# Patient Record
(demographics unavailable — no encounter records)

---

## 2024-11-02 NOTE — HISTORY OF PRESENT ILLNESS
[Father] : father [Normal] : Normal [Brushing teeth twice/d] : brushing teeth twice per day [Yes] : Patient goes to dentist yearly [Tap water] : Primary Fluoride Source: Tap water [Grade ___] : Grade [unfilled] [Special Education] : special education  [No] : No cigarette smoke exposure [Appropriately restrained in motor vehicle] : appropriately restrained in motor vehicle [Supervised outdoor play] : supervised outdoor play [Up to date] : Up to date [NO] : No [FreeTextEntry7] : on ripserdal .25 mg, has eczema [de-identified] : eating better [FreeTextEntry8] : still constipated [FreeTextEntry9] : EDWARD at home [de-identified] : much better in school -  [FreeTextEntry1] : much better at home and school with risperdal

## 2024-11-02 NOTE — PHYSICAL EXAM
[Alert] : alert [No Acute Distress] : no acute distress [Normocephalic] : normocephalic [Conjunctivae with no discharge] : conjunctivae with no discharge [PERRL] : PERRL [EOMI Bilateral] : EOMI bilateral [Auricles Well Formed] : auricles well formed [Clear Tympanic membranes with present light reflex and bony landmarks] : clear tympanic membranes with present light reflex and bony landmarks [No Discharge] : no discharge [Nares Patent] : nares patent [Pink Nasal Mucosa] : pink nasal mucosa [Palate Intact] : palate intact [Nonerythematous Oropharynx] : nonerythematous oropharynx [Supple, full passive range of motion] : supple, full passive range of motion [No Palpable Masses] : no palpable masses [Symmetric Chest Rise] : symmetric chest rise [Clear to Auscultation Bilaterally] : clear to auscultation bilaterally [Regular Rate and Rhythm] : regular rate and rhythm [Normal S1, S2 present] : normal S1, S2 present [No Murmurs] : no murmurs [Soft] : soft [NonTender] : non tender [Non Distended] : non distended [Normoactive Bowel Sounds] : normoactive bowel sounds [No Hepatomegaly] : no hepatomegaly [No Splenomegaly] : no splenomegaly [Joaquin: _____] : Joaquin [unfilled] [Testicles Descended Bilaterally] : testicles descended bilaterally [Patent] : patent [No fissures] : no fissures [No Abnormal Lymph Nodes Palpated] : no abnormal lymph nodes palpated [No Gait Asymmetry] : no gait asymmetry [No pain or deformities with palpation of bone, muscles, joints] : no pain or deformities with palpation of bone, muscles, joints [Normal Muscle Tone] : normal muscle tone [Straight] : straight [+2 Patella DTR] : +2 patella DTR [Cranial Nerves Grossly Intact] : cranial nerves grossly intact [No Rash or Lesions] : no rash or lesions [de-identified] : poor eye contact, autism [de-identified] : mild eczema behind the knees

## 2024-11-02 NOTE — PHYSICAL EXAM
[Alert] : alert [No Acute Distress] : no acute distress [Normocephalic] : normocephalic [Conjunctivae with no discharge] : conjunctivae with no discharge [PERRL] : PERRL [EOMI Bilateral] : EOMI bilateral [Auricles Well Formed] : auricles well formed [Clear Tympanic membranes with present light reflex and bony landmarks] : clear tympanic membranes with present light reflex and bony landmarks [No Discharge] : no discharge [Nares Patent] : nares patent [Pink Nasal Mucosa] : pink nasal mucosa [Palate Intact] : palate intact [Nonerythematous Oropharynx] : nonerythematous oropharynx [Supple, full passive range of motion] : supple, full passive range of motion [No Palpable Masses] : no palpable masses [Symmetric Chest Rise] : symmetric chest rise [Clear to Auscultation Bilaterally] : clear to auscultation bilaterally [Regular Rate and Rhythm] : regular rate and rhythm [Normal S1, S2 present] : normal S1, S2 present [No Murmurs] : no murmurs [Soft] : soft [NonTender] : non tender [Non Distended] : non distended [Normoactive Bowel Sounds] : normoactive bowel sounds [No Hepatomegaly] : no hepatomegaly [No Splenomegaly] : no splenomegaly [Joaquin: _____] : Joaquin [unfilled] [Testicles Descended Bilaterally] : testicles descended bilaterally [Patent] : patent [No fissures] : no fissures [No Abnormal Lymph Nodes Palpated] : no abnormal lymph nodes palpated [No Gait Asymmetry] : no gait asymmetry [No pain or deformities with palpation of bone, muscles, joints] : no pain or deformities with palpation of bone, muscles, joints [Normal Muscle Tone] : normal muscle tone [Straight] : straight [+2 Patella DTR] : +2 patella DTR [Cranial Nerves Grossly Intact] : cranial nerves grossly intact [No Rash or Lesions] : no rash or lesions [de-identified] : poor eye contact, autism [de-identified] : mild eczema behind the knees

## 2024-11-02 NOTE — DISCUSSION/SUMMARY
[FreeTextEntry1] : Continue balanced diet with all food groups. Brush teeth twice a day with toothbrush. Recommend visit to dentist. Help child to maintain consistent daily routines and sleep schedule. School discussed. Ensure home is safe. Teach child about personal safety. Use consistent, positive discipline. Limit screen time to no more than 2 hours per day. Encourage physical activity. Child needs to ride in a belt-positioning booster seat until  4 feet 9 inches has been reached and are between 8 and 12 years of age. Counseling for nutrition and physical activity. Child is diagnosed with autism.  Must have special education setting which specializes in educating children with autism.  Will require intensive behavioral services, parent training, OT, ST, social skills  Continue riperdal as per child psych  20 minutes in discussion and management of problem and excluding any time spent in separate reportable services and/or teaching.

## 2024-11-02 NOTE — HISTORY OF PRESENT ILLNESS
[Father] : father [Normal] : Normal [Brushing teeth twice/d] : brushing teeth twice per day [Yes] : Patient goes to dentist yearly [Tap water] : Primary Fluoride Source: Tap water [Grade ___] : Grade [unfilled] [Special Education] : special education  [No] : No cigarette smoke exposure [Appropriately restrained in motor vehicle] : appropriately restrained in motor vehicle [Supervised outdoor play] : supervised outdoor play [Up to date] : Up to date [NO] : No [FreeTextEntry7] : on ripserdal .25 mg, has eczema [de-identified] : eating better [FreeTextEntry8] : still constipated [FreeTextEntry9] : EDWARD at home [de-identified] : much better in school -  [FreeTextEntry1] : much better at home and school with risperdal

## 2024-11-04 NOTE — HISTORY OF PRESENT ILLNESS
[Fever] : FEVER [___ Day(s)] : [unfilled] day(s) [Known Exposure to COVID-19] : no known exposure to COVID-19 [Sick Contacts: ___] : no sick contacts [Nasal Congestion] : nasal congestion [Cough] : cough [Max Temp: ____] : Max temperature: [unfilled] [FreeTextEntry1] : T102

## 2024-11-04 NOTE — PHYSICAL EXAM
[Mucoid Discharge] : mucoid discharge [Erythematous Oropharynx] : erythematous oropharynx [NL] : regular rate and rhythm, normal S1, S2 audible, no murmurs

## 2024-11-27 NOTE — ASSESSMENT
[FreeTextEntry1] : 8year-old male with a history of autism here for follow-up of constipation.  He has been on senna and Ex-Lax for most of his life and he is currently on 1 Ex-Lax daily and stooling most days.  Stools are generally soft however he does report straining and some pain with stooling.  His diet is very limited and he consumes very little fiber which is likely the culprit.  He denies issues swallowing.  Recommend: -Add 2 to 3 teaspoons Benefiber daily and monitor stools.  If doing well can try decreasing the Ex-Lax to three-quarter square daily.  Goal of soft daily stools - increase fiber and fluids in diet if possible, discussed trying to do smoothies as he does not take much fruit or vegetables but dad thinks he would like smoothies - Family instructed to call if any questions or concerns. Family was asked to make a follow-up visit to be seen in 4-5 months.

## 2024-11-27 NOTE — PHYSICAL EXAM
[Well Developed] : well developed [Well Nourished] : well nourished [NAD] : in no acute distress [Alert and Active] : alert and active [PERRL] : pupils were equal, round, reactive to light  [Moist & Pink Mucous Membranes] : moist and pink mucous membranes [Normal Oropharynx] : the oropharynx was normal [CTAB] : lungs clear to auscultation bilaterally [Regular Rate and Rhythm] : regular rate and rhythm [Normal S1, S2] : normal S1 and S2 [Soft] : soft  [Normal Bowel Sounds] : normal bowel sounds [No HSM] : no hepatosplenomegaly appreciated [Rectal Exam Deferred] : rectal exam was deferred [Normal Tone] : normal tone [Well-Perfused] : well-perfused [Interactive] : interactive [Appropriate Affect] : appropriate affect [Appropriate Behavior] : appropriate behavior [icteric] : anicteric [Oral Ulcers] : no oral ulcers [Respiratory Distress] : no respiratory distress  [Wheeze] : no wheezing  [Murmur] : no murmur [Distended] : non distended [Tender] : non tender [Stool Palpable] : no stool palpable [Mass ___ cm] : no masses were palpated [Lymphadenopathy] : no lymphadenopathy  [Edema] : no edema [Cyanosis] : no cyanosis [Rash] : no rash [Jaundice] : no jaundice [de-identified] : Had trouble staying on topic at times

## 2024-11-27 NOTE — PHYSICAL EXAM
[Well Developed] : well developed [Well Nourished] : well nourished [NAD] : in no acute distress [Alert and Active] : alert and active [PERRL] : pupils were equal, round, reactive to light  [Moist & Pink Mucous Membranes] : moist and pink mucous membranes [Normal Oropharynx] : the oropharynx was normal [CTAB] : lungs clear to auscultation bilaterally [Regular Rate and Rhythm] : regular rate and rhythm [Normal S1, S2] : normal S1 and S2 [Soft] : soft  [Normal Bowel Sounds] : normal bowel sounds [No HSM] : no hepatosplenomegaly appreciated [Rectal Exam Deferred] : rectal exam was deferred [Normal Tone] : normal tone [Well-Perfused] : well-perfused [Interactive] : interactive [Appropriate Affect] : appropriate affect [Appropriate Behavior] : appropriate behavior [icteric] : anicteric [Oral Ulcers] : no oral ulcers [Respiratory Distress] : no respiratory distress  [Wheeze] : no wheezing  [Murmur] : no murmur [Distended] : non distended [Tender] : non tender [Stool Palpable] : no stool palpable [Mass ___ cm] : no masses were palpated [Lymphadenopathy] : no lymphadenopathy  [Edema] : no edema [Cyanosis] : no cyanosis [Rash] : no rash [Jaundice] : no jaundice [de-identified] : Had trouble staying on topic at times

## 2024-11-27 NOTE — CONSULT LETTER
[Dear  ___] : Dear  [unfilled], [Courtesy Letter:] : I had the pleasure of seeing your patient, [unfilled], in my office today. [Please see my note below.] : Please see my note below. [Consult Closing:] : Thank you very much for allowing me to participate in the care of this patient.  If you have any questions, please do not hesitate to contact me. [Sincerely,] : Sincerely, [FreeTextEntry3] : Trinidad Lepe MD\par  Attending Physician\par  Pediatric Gastroenterology and Nutrition

## 2024-11-27 NOTE — HISTORY OF PRESENT ILLNESS
[de-identified] : This is a 8  year old patient is on the autism spectrum here for follow-up of constipation.  I reviewed the recent notes by the pediatrician.  He is generally stooling daily, but misses a day about 1x per wk. [place] 4-5. There is no blood or mucous in the stool. Sometimes hard to stool.   He is on 1 ex-lax daily and is not taking MiraLAX anymore.  No stool accidents. No stomach aches.  Eating well, picky,  Recently he has been eating better, started a new medication 0.25 Risperdal,  He is doing better , eating more food and more variety, but not many fruit or veg, he does like smoothies. No trouble swallowing food.  Food is not getting stuck.  He gained weight.

## 2024-11-27 NOTE — HISTORY OF PRESENT ILLNESS
[de-identified] : This is a 8  year old patient is on the autism spectrum here for follow-up of constipation.  I reviewed the recent notes by the pediatrician.  He is generally stooling daily, but misses a day about 1x per wk. [place] 4-5. There is no blood or mucous in the stool. Sometimes hard to stool.   He is on 1 ex-lax daily and is not taking MiraLAX anymore.  No stool accidents. No stomach aches.  Eating well, picky,  Recently he has been eating better, started a new medication 0.25 Risperdal,  He is doing better , eating more food and more variety, but not many fruit or veg, he does like smoothies. No trouble swallowing food.  Food is not getting stuck.  He gained weight.

## 2024-11-27 NOTE — PHYSICAL EXAM
[Wheezing] : wheezing [NL] : soft, nontender, nondistended, normal bowel sounds, no hepatosplenomegaly

## 2024-11-29 NOTE — HISTORY OF PRESENT ILLNESS
[FreeTextEntry6] : mom took o2 sat while sleeping and it was 87 - called 911 and then it was 94 when awake and gave albuterol.ow is fnow is fine discussed use of oximeter coughs non-stop at night

## 2025-01-08 NOTE — HISTORY OF PRESENT ILLNESS
[FreeTextEntry6] : c/o of left frontal temporal headache -  banged head over weekend on table  no vomiting stuffed nose better now

## 2025-01-08 NOTE — PHYSICAL EXAM
[Tenderness] : no tenderness [Ecchymosis] : no ecchymosis [Swelling] : no swelling [EOMI] : grossly EOMI [NL] : soft, nontender, nondistended, normal bowel sounds, no hepatosplenomegaly [FreeTextEntry5] : fundi benign

## 2025-01-10 NOTE — HISTORY OF PRESENT ILLNESS
[FreeTextEntry1] : Eczema [de-identified] : pt with ASD here for  fu:  Atopic dermatitis  Present x years- using hydrocortisone, flaring on face and body, itchy, hives up when he scratches. Itch disrupting school given his ASD   Moisturizes with cerave cream  Mustela for soap

## 2025-01-10 NOTE — ASSESSMENT
[FreeTextEntry1] : 1. Atopic dermatitis 2. Xerosis - FACE: START Hydrocortisone 2.5% BID ointment as needed to rough areas on face, avoid eyes, 2 weeks on / 1 week off, SED.  - BODY: START triamcinolone 0.1% ointment BID x 2-3 weeks PRN flare, take 1 week break before repeated cycles PRN flare. Avoid face, axilla, groin.  - Dry skin care and approved product list reviewed - switch from creams to ointments for winter  - START Allegra 1-2x/daily

## 2025-01-10 NOTE — HISTORY OF PRESENT ILLNESS
[FreeTextEntry1] : Eczema [de-identified] : pt with ASD here for  fu:  Atopic dermatitis  Present x years- using hydrocortisone, flaring on face and body, itchy, hives up when he scratches. Itch disrupting school given his ASD   Moisturizes with cerave cream  Mustela for soap

## 2025-01-10 NOTE — PHYSICAL EXAM
[Alert] : alert [Oriented x 3] : ~L oriented x 3 [FreeTextEntry3] : Focused exam performed. Findings notable for: Thin eczematous patches on abdomen and lower extremities Generalized xerosis hypopigmented patches on b/l cheeks with thin eczematous patches

## 2025-01-27 NOTE — HISTORY OF PRESENT ILLNESS
[EENT/Resp Symptoms] : EENT/RESPIRATORY SYMPTOMS [Runny nose] : runny nose [Cough] : cough [___ Day(s)] : [unfilled] day(s) [Clear rhinorrhea] : clear rhinorrhea [Sick Contacts: ___] : no sick contacts [Fever] : no fever [Vomiting] : no vomiting [Diarrhea] : no diarrhea

## 2025-01-27 NOTE — DISCUSSION/SUMMARY
[FreeTextEntry1] : uri supportive care including nsnd, humidifier and saline nebulization if available azelastine nasal spray zyrtec

## 2025-02-12 NOTE — PHYSICAL EXAM
[Rales] : rales [Rhonchi] : rhonchi [NL] : soft, nontender, nondistended, normal bowel sounds, no hepatosplenomegaly [FreeTextEntry7] : right posterior

## 2025-02-12 NOTE — DISCUSSION/SUMMARY
[FreeTextEntry1] : fever cough bronchitis ventolin 2 puffs now o2 sat improved azithromycin resp panel call if sx worsen    30 minutes in discussion and management of problem and excluding any time spent in separate reportable services and/or teaching.

## 2025-02-12 NOTE — HISTORY OF PRESENT ILLNESS
[Fever] : FEVER [___ Day(s)] : [unfilled] day(s) [Cough] : cough [Vomiting] : vomiting [Max Temp: ____] : Max temperature: [unfilled] [Sick Contacts: ___] : no sick contacts [FreeTextEntry4] : albuterol [FreeTextEntry5] : after coughing [de-identified] : temp 103 last nigh seen at [facility] x2 - neg ss neg flu

## 2025-02-17 NOTE — PHYSICAL EXAM
[NL] : soft, nontender, nondistended, normal bowel sounds, no hepatosplenomegaly [FreeTextEntry4] : congestion

## 2025-02-17 NOTE — DISCUSSION/SUMMARY
[FreeTextEntry1] : marked nasal congestion continue ventolin 2-3x/day azelestine nasal spray saline neb - demostrated in office zyrte  phone f/u on wednesday

## 2025-02-21 NOTE — HISTORY OF PRESENT ILLNESS
[No Personal or Family History of Easy Bruising, Bleeding, or Issues with General Anesthesia] : No Personal or Family History of easy bruising, bleeding, or issues with general anesthesia [de-identified] : 7 y/o M presents for epistaxis  History of autism  Two episodes of b/l nare nosebleeds within the past month.  Mother noted coughing up blood with nosebleeds.  No hospitalizations or head traumas. Episodes lasted 5-10 min -- usually resolved through cold compress and pressure with tissues.  Using vaseline PRN  Using humidifier every night

## 2025-02-21 NOTE — HISTORY OF PRESENT ILLNESS
[de-identified] :      9 y/o M presents for epistaxis History of autism Two episodes of b/l nare nosebleeds within the past month. Mother noted coughing up blood with nosebleeds. No hospitalizations or head traumas. Episodes lasted 5-10 min -- usually resolved through cold compress and pressure with tissues. Using vaseline PRN Using humidifier every night.   History or Symptoms:. No Personal or Family History of easy bruising, bleeding, or issues with general anesthesia.

## 2025-03-21 NOTE — PLAN
[Contact was Attempted] : contact was attempted [Reached regarding Plan] : not reached regarding plan [TextBox_9] : continue with current providers until linkage provided to new psychiatrist,  CBT therapist & social skills group [TextBox_11] : continue current regimen as prescribed by outpatient psychiatrist  [TextBox_13] : Parent denies patient has ever expressed SHIIP, denies knowledge or evidence of SIB, no acute safety concerns. Family aware to visit ED or call 911 if symptoms worsen or if safety concerns arise. [TextBox_26] : parents declined consent

## 2025-03-21 NOTE — SOCIAL HISTORY
[No Known Substance Use] : no known substance use [FreeTextEntry1] : Pt is an 9 y/o male in 3rd grade at [employer], domiciled with parents, w/ PPH of ASD, currently in treatment with psychologist for therapy sessions as well as psychiatrist for medication management, has in-home behaviorist 10 hours per week, no past inpt admissions, no past suicide attempts or SIB, no substance use and no hx of abuse, BIB parents for evaluation of behavioral concerns and help connecting to resources.

## 2025-03-21 NOTE — RISK ASSESSMENT
[Clinical Interview] : Clinical Interview [Collateral Sources] : Collateral Sources [History of Impulsivity] : history of impulsivity [Identifies reasons for living] : identifies reasons for living [Supportive social network of family or friends] : supportive social network of family or friends [Positive therapeutic relationships] : positive therapeutic relationships [Responsibility to children, family, or others] : responsibility to children, family, or others [Engaged in work or school] : engaged in work or school [Yes (details below)] : yes [None Known] : none known [Yes, within past 3 months] : yes, within past 3 months [No] : no [Affective dysregulation] : affective dysregulation [Impulsivity] : impulsivity [Irritability] : irritability [Residential stability] : residential stability [Relationship stability] : relationship stability [Sobriety] : sobriety [Yes] : yes

## 2025-03-21 NOTE — PHYSICAL EXAM
[Cooperative] : cooperative [Euthymic] : euthymic [Full] : full [Clear] : clear [Linear/Goal Directed] : linear/goal directed [None Reported] : none reported [Average] : average [WNL] : within normal limits [Positive interaction] : positive interaction [Unremarkable/age appropriate] : unremarkable/age appropriate [Impulsive] : impulsive [Moderate] : moderate [Normal] : normal [None] : none [FreeTextEntry1] : thin built

## 2025-03-21 NOTE — REASON FOR VISIT
[Behavioral Health Urgent Care Assessment] : a behavioral health urgent care assessment [Patient] : patient [Self] : alone [Parents] : with parents [TextBox_17] : connection to care

## 2025-03-21 NOTE — HISTORY OF PRESENT ILLNESS
[FreeTextEntry1] : Pt is an 9 y/o male in 3rd grade at [employer], domiciled with parents, w/ PPH of ASD, currently in treatment with psychologist for therapy sessions as well as psychiatrist for medication management, has in-home behaviorist 10 hours per week, no past inpt admissions, no past suicide attempts or SIB, no substance use and no hx of abuse, BIB parents for evaluation of behavioral concerns and help connecting to resources.   Parents requested for writer to meet with pt and parents all together.  Pt states that he is here because he often engages in behaviors that he knows are 'bad' or that he shouldn't do when he is angry. He admits that he has a tendency to become easily upset/angered, especially when his peers have different ideas or opinions from him. He states that when he is angry he feels 'furious' and does/says things that he later on regrets, including becoming physically aggressive. He admits that he at one point told a teacher he was going to kill her, although he denies that this was something he truly meant. He admits that he felt badly afterwards, and once he calmed down he was not sure why he said something of that nature. He denies any current or past SI/I/P or HI/I/P. He denies any hx of SIB/SA. Outside of the moments when he becomes angry, he describes his mood as happy. He denies experiencing any sxs of major depression. Socially, he reports having friends and denies any bullying. He is future oriented and wishes to become a doctor when he grows up. Patient denies/does not display symptoms of adair including decreased need for sleep, increase in goal directed activity, grandiosity, pressured speech, flight of ideas, racing thoughts, increased risk taking behaviors. Patient denies/does not display symptoms of psychosis including disorganization of speech/thought, auditory/visual hallucinations, preoccupations/delusions. Patient denies anxiety, panic, obsessions/compulsions.  Parents confirm pts PPH of ASD, with IEP in school which includes special classroom setting (12:1:1). Pt also has behaviorist in school, as well as 10 hours per week of behavioral therapy at home. Pt is also engaged in individual therapy with private psychologist 1x per week on Saturdays, and works with private psychiatrist for medication management. Parents confirm pt is currently prescribed .5 risperidone, with past medication trial of guanfacine (parents did not find it effective). They report that there was prior concern for ADHD when pt was younger, and he was evaluated by psychiatrist about 2 years ago. However, they felt that pt did not meet clinical criteria for diagnosis at the time. In school, pts behaviors were worse last year in the 2nd grade, as evidenced by him having frequent emotional outbursts that involved physical aggression (i.e. flipping tables and chairs), as well as eloping from the classroom. Parents feel that the episodes of physical aggression have improved this year, but they feel that pt still struggles emotionally and behaviorally in school. They feel that pt has a tendency to become easily upset/triggered by small stressors. They notice similar behaviors at home, although to a lesser degree. At home, pts most significant triggers are limits being set or being told 'no'. Socially, they feel pt struggles due to becoming very impatient with his peers and becoming very emotional when they don't immediately respond to him. They deny any hx of SI/I/P or HI/I/P. They deny any hx of SIB/SA. They also wonder if part of pts behaviors may be related to underlying anxiety, due to him struggling with uncertainty and asking many questions when entering unfamiliar situations. Parents deny any hx of trauma or abuse. Parents deny having any acute safety concerns for pt at this time. They are receptive to linkage to new psychiatrist as they feel current one does not have positive rapport with pt, as well as linkage to CBT therapist and social skills group. Parents aware to have pt continue working with current provider until connected to new care.  [FreeTextEntry2] : In-home behaviorist 10 hours per week Private psychologist for weekly therapy sessions on Saturday Psychiatrist for medication management  [FreeTextEntry3] : Guanfacine (parents did not find it effective)  Risperidone (current)

## 2025-03-21 NOTE — DISCUSSION/SUMMARY
[Low acute suicide risk] : Low acute suicide risk [No] : No [Not clinically indicated] : Safety Plan completed/updated (for individuals at risk): Not clinically indicated [FreeTextEntry1] : Patient presents as low acute risk, with risk factors including history of low frustration tolerance and impulsive behaviors.  Patient has significant protective factors including strong family/social support, lack of prior self-harm, no suicide attempts, no substance use, current willingness to engage in treatment, participation in safety planning, future orientation with long & short term goals for the future, hopeful, help-seeking, engaged in school & activities, current denial of any SI, plan or intent or urges to self-harm, no reported hx of abuse/trauma, no access to guns/family is able to means restrict, no legal history.

## 2025-03-21 NOTE — SOCIAL HISTORY
[No Known Substance Use] : no known substance use [FreeTextEntry1] : Pt is an 7 y/o male in 3rd grade at [employer], domiciled with parents, w/ PPH of ASD, currently in treatment with psychologist for therapy sessions as well as psychiatrist for medication management, has in-home behaviorist 10 hours per week, no past inpt admissions, no past suicide attempts or SIB, no substance use and no hx of abuse, BIB parents for evaluation of behavioral concerns and help connecting to resources.

## 2025-03-21 NOTE — HISTORY OF PRESENT ILLNESS
[FreeTextEntry1] : Pt is an 9 y/o male in 3rd grade at [employer], domiciled with parents, w/ PPH of ASD, currently in treatment with psychologist for therapy sessions as well as psychiatrist for medication management, has in-home behaviorist 10 hours per week, no past inpt admissions, no past suicide attempts or SIB, no substance use and no hx of abuse, BIB parents for evaluation of behavioral concerns and help connecting to resources.   Parents requested for writer to meet with pt and parents all together.  Pt states that he is here because he often engages in behaviors that he knows are 'bad' or that he shouldn't do when he is angry. He admits that he has a tendency to become easily upset/angered, especially when his peers have different ideas or opinions from him. He states that when he is angry he feels 'furious' and does/says things that he later on regrets, including becoming physically aggressive. He admits that he at one point told a teacher he was going to kill her, although he denies that this was something he truly meant. He admits that he felt badly afterwards, and once he calmed down he was not sure why he said something of that nature. He denies any current or past SI/I/P or HI/I/P. He denies any hx of SIB/SA. Outside of the moments when he becomes angry, he describes his mood as happy. He denies experiencing any sxs of major depression. Socially, he reports having friends and denies any bullying. He is future oriented and wishes to become a doctor when he grows up. Patient denies/does not display symptoms of adair including decreased need for sleep, increase in goal directed activity, grandiosity, pressured speech, flight of ideas, racing thoughts, increased risk taking behaviors. Patient denies/does not display symptoms of psychosis including disorganization of speech/thought, auditory/visual hallucinations, preoccupations/delusions. Patient denies anxiety, panic, obsessions/compulsions.  Parents confirm pts PPH of ASD, with IEP in school which includes special classroom setting (12:1:1). Pt also has behaviorist in school, as well as 10 hours per week of behavioral therapy at home. Pt is also engaged in individual therapy with private psychologist 1x per week on Saturdays, and works with private psychiatrist for medication management. Parents confirm pt is currently prescribed .5 risperidone, with past medication trial of guanfacine (parents did not find it effective). They report that there was prior concern for ADHD when pt was younger, and he was evaluated by psychiatrist about 2 years ago. However, they felt that pt did not meet clinical criteria for diagnosis at the time. In school, pts behaviors were worse last year in the 2nd grade, as evidenced by him having frequent emotional outbursts that involved physical aggression (i.e. flipping tables and chairs), as well as eloping from the classroom. Parents feel that the episodes of physical aggression have improved this year, but they feel that pt still struggles emotionally and behaviorally in school. They feel that pt has a tendency to become easily upset/triggered by small stressors. They notice similar behaviors at home, although to a lesser degree. At home, pts most significant triggers are limits being set or being told 'no'. Socially, they feel pt struggles due to becoming very impatient with his peers and becoming very emotional when they don't immediately respond to him. They deny any hx of SI/I/P or HI/I/P. They deny any hx of SIB/SA. They also wonder if part of pts behaviors may be related to underlying anxiety, due to him struggling with uncertainty and asking many questions when entering unfamiliar situations. Parents deny any hx of trauma or abuse. Parents deny having any acute safety concerns for pt at this time. They are receptive to linkage to new psychiatrist as they feel current one does not have positive rapport with pt, as well as linkage to CBT therapist and social skills group. Parents aware to have pt continue working with current provider until connected to new care.  [FreeTextEntry2] : In-home behaviorist 10 hours per week Private psychologist for weekly therapy sessions on Saturday Psychiatrist for medication management  [FreeTextEntry3] : Guanfacine (parents did not find it effective)  Risperidone (current)

## 2025-03-26 NOTE — DISCUSSION/SUMMARY
[FreeTextEntry1] : tired after febrile illness on reisperdal referral to dr. vigil - neuropsych referral to [facility] clinic lab workup for tiredness

## 2025-03-26 NOTE — HISTORY OF PRESENT ILLNESS
[FreeTextEntry6] : fever on friday and saturday 101-102 seen a pm peds with sorethroat neg srep, neg flu neg covid feeling tired for a while needs neuropsych and psychiatrist to reevaluate because of disagreement with the school

## 2025-03-26 NOTE — DISCUSSION/SUMMARY
[FreeTextEntry1] : tired after febrile illness on reisperdal referral to dr. vigil - neuropsych referral to [facility] clinic lab workup for tiredness

## 2025-04-28 NOTE — PHYSICAL EXAM
[Well Developed] : well developed [Well Nourished] : well nourished [NAD] : in no acute distress [Alert and Active] : alert and active [PERRL] : pupils were equal, round, reactive to light  [icteric] : anicteric [Moist & Pink Mucous Membranes] : moist and pink mucous membranes [Normal Oropharynx] : the oropharynx was normal [Oral Ulcers] : no oral ulcers [CTAB] : lungs clear to auscultation bilaterally [Respiratory Distress] : no respiratory distress  [Wheeze] : no wheezing  [Regular Rate and Rhythm] : regular rate and rhythm [Normal S1, S2] : normal S1 and S2 [Murmur] : no murmur [Soft] : soft  [Distended] : non distended [Tender] : non tender [Normal Bowel Sounds] : normal bowel sounds [Stool Palpable] : no stool palpable [Mass ___ cm] : no masses were palpated [No HSM] : no hepatosplenomegaly appreciated [Rectal Exam Deferred] : rectal exam was deferred [Lymphadenopathy] : no lymphadenopathy  [Normal Tone] : normal tone [Well-Perfused] : well-perfused [Edema] : no edema [Cyanosis] : no cyanosis [Rash] : no rash [Jaundice] : no jaundice [Interactive] : interactive [Appropriate Affect] : appropriate affect [Appropriate Behavior] : appropriate behavior [de-identified] : Had trouble staying on topic at times

## 2025-04-28 NOTE — HISTORY OF PRESENT ILLNESS
[de-identified] : This is a 8  year old patient is on the autism spectrum here for follow-up of constipation.  I reviewed the recent notes by the pediatrician.  He is generally stooling daily, but misses a day about 1x per wk. [place]linger 3. There is no blood or mucous in the stool. Sometimes hard to stool.   He is on 1 ex-lax daily and is not taking MiraLAX anymore.  No stool accidents. No stomach aches.  Eating well, picky,  He is 0.25 Risperdal,  He is doing better , eating more food and more variety, but not many fruit or veg, he does like smoothies. No trouble swallowing food except moya. Sometimes spits his food out if the bite is too big or spits out food that is uncomfortable. Usually the food is comfortable.   Food is not getting stuck generally.  He gained weight. Broccoli. Will eat a few bites fine, then sometimes spits it out. Sometimes does not seem to want

## 2025-05-22 NOTE — PHYSICAL EXAM
[NL] : warm, clear [FreeTextEntry1] : NAD [FreeTextEntry7] : clear no retractions, no wheeze no crackles [FreeTextEntry8] : nl

## 2025-05-22 NOTE — REVIEW OF SYSTEMS
[Cough] : cough [Vomiting] : vomiting [Negative] : Genitourinary [Tachypnea] : not tachypneic [Wheezing] : no wheezing [Diarrhea] : no diarrhea [Abdominal Pain] : no abdominal pain

## 2025-05-22 NOTE — HISTORY OF PRESENT ILLNESS
[FreeTextEntry6] : Cough x 1 mos light.  Last 3 days coughing more, coughed for 2 hrs , albuterol with chamber device helps.  No fever, no RN./  Little vomiting child says, no diarrhea   father

## 2025-05-22 NOTE — DISCUSSION/SUMMARY
[FreeTextEntry1] : NAD, occ cough.  Possible RAD now exacerbated.  ALbuterol helps. Reviewed how to dose it, father unaware. Not more often than 2 p q4 hrs, and 2p tid if stable.  Add Asmanex 2p bid until better, use explained.  written directions given COVID neg RTO if not improving or worse.

## 2025-06-04 NOTE — DISCUSSION/SUMMARY
[FreeTextEntry1] : cough - supportive care including nsnd, humidifier and saline nebulization if available anxiety

## 2025-06-04 NOTE — HISTORY OF PRESENT ILLNESS
[de-identified] : cough [FreeTextEntry6] : coughing on and off - responds to albuterol father states that Kenan has had increasing anxiety from treatment at school.  Kenan doesn't like school now - peers ignore him Thinks that he "makes bad choices."